# Patient Record
Sex: FEMALE | Race: BLACK OR AFRICAN AMERICAN | Employment: OTHER | URBAN - METROPOLITAN AREA
[De-identification: names, ages, dates, MRNs, and addresses within clinical notes are randomized per-mention and may not be internally consistent; named-entity substitution may affect disease eponyms.]

---

## 2023-01-01 ENCOUNTER — HOSPITAL ENCOUNTER (EMERGENCY)
Age: 78
End: 2023-06-29
Attending: EMERGENCY MEDICINE
Payer: MEDICARE

## 2023-01-01 DIAGNOSIS — I46.9 CARDIOPULMONARY ARREST (HCC): Primary | ICD-10-CM

## 2023-01-01 LAB
GLUCOSE BLD-MCNC: 345 MG/DL (ref 70–99)
PERFORMED ON: ABNORMAL

## 2023-01-01 PROCEDURE — 36415 COLL VENOUS BLD VENIPUNCTURE: CPT

## 2023-01-01 PROCEDURE — 83880 ASSAY OF NATRIURETIC PEPTIDE: CPT

## 2023-01-01 PROCEDURE — 31500 INSERT EMERGENCY AIRWAY: CPT

## 2023-01-01 PROCEDURE — 85025 COMPLETE CBC W/AUTO DIFF WBC: CPT

## 2023-01-01 PROCEDURE — 85610 PROTHROMBIN TIME: CPT

## 2023-01-01 PROCEDURE — 80053 COMPREHEN METABOLIC PANEL: CPT

## 2023-01-01 PROCEDURE — 84484 ASSAY OF TROPONIN QUANT: CPT

## 2023-01-01 PROCEDURE — 92950 HEART/LUNG RESUSCITATION CPR: CPT

## 2023-01-01 PROCEDURE — 99284 EMERGENCY DEPT VISIT MOD MDM: CPT

## 2023-04-05 ENCOUNTER — APPOINTMENT (OUTPATIENT)
Dept: CT IMAGING | Age: 78
End: 2023-04-05
Payer: MEDICARE

## 2023-04-05 ENCOUNTER — APPOINTMENT (OUTPATIENT)
Dept: GENERAL RADIOLOGY | Age: 78
End: 2023-04-05
Payer: MEDICARE

## 2023-04-05 ENCOUNTER — HOSPITAL ENCOUNTER (INPATIENT)
Age: 78
LOS: 2 days | Discharge: HOME | End: 2023-04-08
Attending: EMERGENCY MEDICINE | Admitting: STUDENT IN AN ORGANIZED HEALTH CARE EDUCATION/TRAINING PROGRAM
Payer: MEDICARE

## 2023-04-05 DIAGNOSIS — S00.11XA TRAUMATIC HEMATOMA OF RIGHT EYEBROW, INITIAL ENCOUNTER: ICD-10-CM

## 2023-04-05 DIAGNOSIS — R79.89 ELEVATED LACTIC ACID LEVEL: ICD-10-CM

## 2023-04-05 DIAGNOSIS — R74.8 ELEVATED CK: Primary | ICD-10-CM

## 2023-04-05 DIAGNOSIS — A41.9 SEPTICEMIA (HCC): ICD-10-CM

## 2023-04-05 DIAGNOSIS — D32.0 MENINGIOMA, CEREBRAL (HCC): ICD-10-CM

## 2023-04-05 DIAGNOSIS — R29.6 FREQUENT FALLS: ICD-10-CM

## 2023-04-05 LAB
ALBUMIN SERPL-MCNC: 4.1 G/DL (ref 3.4–5)
ALBUMIN/GLOB SERPL: 1.2 {RATIO} (ref 1.1–2.2)
ALP SERPL-CCNC: 129 U/L (ref 40–129)
ALT SERPL-CCNC: 13 U/L (ref 10–40)
ANION GAP SERPL CALCULATED.3IONS-SCNC: 15 MMOL/L (ref 3–16)
AST SERPL-CCNC: 24 U/L (ref 15–37)
BILIRUB SERPL-MCNC: 0.9 MG/DL (ref 0–1)
BUN SERPL-MCNC: 19 MG/DL (ref 7–20)
CALCIUM SERPL-MCNC: 9.5 MG/DL (ref 8.3–10.6)
CHLORIDE SERPL-SCNC: 96 MMOL/L (ref 99–110)
CK SERPL-CCNC: 689 U/L (ref 26–192)
CO2 SERPL-SCNC: 21 MMOL/L (ref 21–32)
CREAT SERPL-MCNC: 0.9 MG/DL (ref 0.6–1.2)
DEPRECATED RDW RBC AUTO: 20.5 % (ref 12.4–15.4)
GFR SERPLBLD CREATININE-BSD FMLA CKD-EPI: >60 ML/MIN/{1.73_M2}
GLUCOSE SERPL-MCNC: 426 MG/DL (ref 70–99)
HCT VFR BLD AUTO: 44.3 % (ref 36–48)
HGB BLD-MCNC: 14.4 G/DL (ref 12–16)
LACTATE BLDV-SCNC: 2.6 MMOL/L (ref 0.4–1.9)
MCH RBC QN AUTO: 22.8 PG (ref 26–34)
MCHC RBC AUTO-ENTMCNC: 32.4 G/DL (ref 31–36)
MCV RBC AUTO: 70.4 FL (ref 80–100)
PLATELET # BLD AUTO: 197 K/UL (ref 135–450)
PMV BLD AUTO: 8.6 FL (ref 5–10.5)
POTASSIUM SERPL-SCNC: 3.8 MMOL/L (ref 3.5–5.1)
PROT SERPL-MCNC: 7.4 G/DL (ref 6.4–8.2)
RBC # BLD AUTO: 6.29 M/UL (ref 4–5.2)
SODIUM SERPL-SCNC: 132 MMOL/L (ref 136–145)
TROPONIN T SERPL-MCNC: <0.01 NG/ML
WBC # BLD AUTO: 11.4 K/UL (ref 4–11)

## 2023-04-05 PROCEDURE — 85379 FIBRIN DEGRADATION QUANT: CPT

## 2023-04-05 PROCEDURE — 80053 COMPREHEN METABOLIC PANEL: CPT

## 2023-04-05 PROCEDURE — 6360000004 HC RX CONTRAST MEDICATION: Performed by: EMERGENCY MEDICINE

## 2023-04-05 PROCEDURE — 70460 CT HEAD/BRAIN W/DYE: CPT

## 2023-04-05 PROCEDURE — 93005 ELECTROCARDIOGRAM TRACING: CPT | Performed by: EMERGENCY MEDICINE

## 2023-04-05 PROCEDURE — 83605 ASSAY OF LACTIC ACID: CPT

## 2023-04-05 PROCEDURE — 87040 BLOOD CULTURE FOR BACTERIA: CPT

## 2023-04-05 PROCEDURE — 85027 COMPLETE CBC AUTOMATED: CPT

## 2023-04-05 PROCEDURE — 84484 ASSAY OF TROPONIN QUANT: CPT

## 2023-04-05 PROCEDURE — 81001 URINALYSIS AUTO W/SCOPE: CPT

## 2023-04-05 PROCEDURE — 87150 DNA/RNA AMPLIFIED PROBE: CPT

## 2023-04-05 PROCEDURE — 2580000003 HC RX 258: Performed by: EMERGENCY MEDICINE

## 2023-04-05 PROCEDURE — 84443 ASSAY THYROID STIM HORMONE: CPT

## 2023-04-05 PROCEDURE — 70450 CT HEAD/BRAIN W/O DYE: CPT

## 2023-04-05 PROCEDURE — 71046 X-RAY EXAM CHEST 2 VIEWS: CPT

## 2023-04-05 PROCEDURE — 82550 ASSAY OF CK (CPK): CPT

## 2023-04-05 RX ORDER — 0.9 % SODIUM CHLORIDE 0.9 %
500 INTRAVENOUS SOLUTION INTRAVENOUS ONCE
Status: COMPLETED | OUTPATIENT
Start: 2023-04-06 | End: 2023-04-06

## 2023-04-05 RX ORDER — 0.9 % SODIUM CHLORIDE 0.9 %
1000 INTRAVENOUS SOLUTION INTRAVENOUS ONCE
Status: DISCONTINUED | OUTPATIENT
Start: 2023-04-06 | End: 2023-04-05

## 2023-04-05 RX ORDER — 0.9 % SODIUM CHLORIDE 0.9 %
1000 INTRAVENOUS SOLUTION INTRAVENOUS ONCE
Status: COMPLETED | OUTPATIENT
Start: 2023-04-05 | End: 2023-04-05

## 2023-04-05 RX ADMIN — IOMEPROL INJECTION 75 ML: 714 INJECTION, SOLUTION INTRAVASCULAR at 23:26

## 2023-04-05 RX ADMIN — SODIUM CHLORIDE 1000 ML: 9 INJECTION, SOLUTION INTRAVENOUS at 21:39

## 2023-04-05 ASSESSMENT — ENCOUNTER SYMPTOMS
DIARRHEA: 0
CHEST TIGHTNESS: 0
NAUSEA: 0
ABDOMINAL PAIN: 0
CONSTIPATION: 0
SHORTNESS OF BREATH: 0
VOMITING: 0
SORE THROAT: 0

## 2023-04-05 ASSESSMENT — PAIN - FUNCTIONAL ASSESSMENT: PAIN_FUNCTIONAL_ASSESSMENT: 0-10

## 2023-04-05 ASSESSMENT — PAIN SCALES - GENERAL: PAINLEVEL_OUTOF10: 0

## 2023-04-05 ASSESSMENT — LIFESTYLE VARIABLES: HOW OFTEN DO YOU HAVE A DRINK CONTAINING ALCOHOL: NEVER

## 2023-04-06 ENCOUNTER — APPOINTMENT (OUTPATIENT)
Dept: MRI IMAGING | Age: 78
End: 2023-04-06
Payer: MEDICARE

## 2023-04-06 ENCOUNTER — APPOINTMENT (OUTPATIENT)
Dept: CT IMAGING | Age: 78
End: 2023-04-06
Payer: MEDICARE

## 2023-04-06 PROBLEM — R00.0 TACHYCARDIA: Status: ACTIVE | Noted: 2023-04-06

## 2023-04-06 PROBLEM — D72.829 LEUKOCYTOSIS: Status: ACTIVE | Noted: 2023-01-01

## 2023-04-06 PROBLEM — E11.9 DM2 (DIABETES MELLITUS, TYPE 2) (HCC): Status: ACTIVE | Noted: 2023-01-01

## 2023-04-06 PROBLEM — R53.1 GENERALIZED WEAKNESS: Status: ACTIVE | Noted: 2023-01-01

## 2023-04-06 LAB
BACTERIA URNS QL MICRO: ABNORMAL /HPF
BILIRUB UR QL STRIP.AUTO: NEGATIVE
CLARITY UR: CLEAR
COLOR UR: YELLOW
D DIMER: 1.07 UG/ML FEU (ref 0–0.6)
EKG ATRIAL RATE: 117 BPM
EKG DIAGNOSIS: NORMAL
EKG P AXIS: -19 DEGREES
EKG P-R INTERVAL: 152 MS
EKG Q-T INTERVAL: 340 MS
EKG QRS DURATION: 74 MS
EKG QTC CALCULATION (BAZETT): 474 MS
EKG R AXIS: -17 DEGREES
EKG T AXIS: 24 DEGREES
EKG VENTRICULAR RATE: 117 BPM
EPI CELLS #/AREA URNS AUTO: 6 /HPF (ref 0–5)
EPITHELIALS (RENAL), 013149: PRESENT
FLUAV RNA UPPER RESP QL NAA+PROBE: NEGATIVE
FLUBV AG NPH QL: NEGATIVE
GLUCOSE BLD-MCNC: 171 MG/DL (ref 70–99)
GLUCOSE BLD-MCNC: 182 MG/DL (ref 70–99)
GLUCOSE BLD-MCNC: 238 MG/DL (ref 70–99)
GLUCOSE BLD-MCNC: 264 MG/DL (ref 70–99)
GLUCOSE BLD-MCNC: 306 MG/DL (ref 70–99)
GLUCOSE BLD-MCNC: 313 MG/DL (ref 70–99)
GLUCOSE UR STRIP.AUTO-MCNC: >=1000 MG/DL
HGB UR QL STRIP.AUTO: ABNORMAL
HYALINE CASTS #/AREA URNS AUTO: 2 /LPF (ref 0–8)
KETONES UR STRIP.AUTO-MCNC: 15 MG/DL
LACTATE BLDV-SCNC: 1.7 MMOL/L (ref 0.4–1.9)
LEUKOCYTE ESTERASE UR QL STRIP.AUTO: NEGATIVE
NITRITE UR QL STRIP.AUTO: NEGATIVE
PERFORMED ON: ABNORMAL
PH UR STRIP.AUTO: 6 [PH] (ref 5–8)
PROT UR STRIP.AUTO-MCNC: 30 MG/DL
RBC CLUMPS #/AREA URNS AUTO: 1 /HPF (ref 0–4)
SARS-COV-2 RDRP RESP QL NAA+PROBE: NOT DETECTED
SP GR UR STRIP.AUTO: 1.02 (ref 1–1.03)
TSH SERPL DL<=0.005 MIU/L-ACNC: 3.52 UIU/ML (ref 0.27–4.2)
UA COMPLETE W REFLEX CULTURE PNL UR: ABNORMAL
UA DIPSTICK W REFLEX MICRO PNL UR: YES
URN SPEC COLLECT METH UR: ABNORMAL
UROBILINOGEN UR STRIP-ACNC: 0.2 E.U./DL
WBC #/AREA URNS AUTO: 2 /HPF (ref 0–5)

## 2023-04-06 PROCEDURE — 6370000000 HC RX 637 (ALT 250 FOR IP): Performed by: STUDENT IN AN ORGANIZED HEALTH CARE EDUCATION/TRAINING PROGRAM

## 2023-04-06 PROCEDURE — 2580000003 HC RX 258: Performed by: STUDENT IN AN ORGANIZED HEALTH CARE EDUCATION/TRAINING PROGRAM

## 2023-04-06 PROCEDURE — 97535 SELF CARE MNGMENT TRAINING: CPT

## 2023-04-06 PROCEDURE — 6360000002 HC RX W HCPCS: Performed by: STUDENT IN AN ORGANIZED HEALTH CARE EDUCATION/TRAINING PROGRAM

## 2023-04-06 PROCEDURE — 6360000002 HC RX W HCPCS: Performed by: INTERNAL MEDICINE

## 2023-04-06 PROCEDURE — 6370000000 HC RX 637 (ALT 250 FOR IP): Performed by: INTERNAL MEDICINE

## 2023-04-06 PROCEDURE — 97530 THERAPEUTIC ACTIVITIES: CPT

## 2023-04-06 PROCEDURE — 94760 N-INVAS EAR/PLS OXIMETRY 1: CPT

## 2023-04-06 PROCEDURE — 87635 SARS-COV-2 COVID-19 AMP PRB: CPT

## 2023-04-06 PROCEDURE — 2500000003 HC RX 250 WO HCPCS: Performed by: STUDENT IN AN ORGANIZED HEALTH CARE EDUCATION/TRAINING PROGRAM

## 2023-04-06 PROCEDURE — 97116 GAIT TRAINING THERAPY: CPT

## 2023-04-06 PROCEDURE — A9577 INJ MULTIHANCE: HCPCS | Performed by: FAMILY MEDICINE

## 2023-04-06 PROCEDURE — 70553 MRI BRAIN STEM W/O & W/DYE: CPT

## 2023-04-06 PROCEDURE — 1200000000 HC SEMI PRIVATE

## 2023-04-06 PROCEDURE — 2580000003 HC RX 258: Performed by: EMERGENCY MEDICINE

## 2023-04-06 PROCEDURE — 6360000004 HC RX CONTRAST MEDICATION: Performed by: STUDENT IN AN ORGANIZED HEALTH CARE EDUCATION/TRAINING PROGRAM

## 2023-04-06 PROCEDURE — 2500000003 HC RX 250 WO HCPCS: Performed by: INTERNAL MEDICINE

## 2023-04-06 PROCEDURE — 6360000004 HC RX CONTRAST MEDICATION: Performed by: FAMILY MEDICINE

## 2023-04-06 PROCEDURE — 87040 BLOOD CULTURE FOR BACTERIA: CPT

## 2023-04-06 PROCEDURE — 87804 INFLUENZA ASSAY W/OPTIC: CPT

## 2023-04-06 PROCEDURE — 83605 ASSAY OF LACTIC ACID: CPT

## 2023-04-06 PROCEDURE — 97166 OT EVAL MOD COMPLEX 45 MIN: CPT

## 2023-04-06 PROCEDURE — 36415 COLL VENOUS BLD VENIPUNCTURE: CPT

## 2023-04-06 PROCEDURE — 97162 PT EVAL MOD COMPLEX 30 MIN: CPT

## 2023-04-06 PROCEDURE — 93010 ELECTROCARDIOGRAM REPORT: CPT | Performed by: INTERNAL MEDICINE

## 2023-04-06 PROCEDURE — 6360000002 HC RX W HCPCS: Performed by: EMERGENCY MEDICINE

## 2023-04-06 PROCEDURE — 71260 CT THORAX DX C+: CPT | Performed by: STUDENT IN AN ORGANIZED HEALTH CARE EDUCATION/TRAINING PROGRAM

## 2023-04-06 PROCEDURE — 93005 ELECTROCARDIOGRAM TRACING: CPT | Performed by: INTERNAL MEDICINE

## 2023-04-06 RX ORDER — INSULIN LISPRO 100 [IU]/ML
4 INJECTION, SOLUTION INTRAVENOUS; SUBCUTANEOUS ONCE
Status: COMPLETED | OUTPATIENT
Start: 2023-04-06 | End: 2023-04-06

## 2023-04-06 RX ORDER — SODIUM CHLORIDE 0.9 % (FLUSH) 0.9 %
5-40 SYRINGE (ML) INJECTION PRN
Status: DISCONTINUED | OUTPATIENT
Start: 2023-04-06 | End: 2023-04-08 | Stop reason: HOSPADM

## 2023-04-06 RX ORDER — DEXTROSE MONOHYDRATE 100 MG/ML
INJECTION, SOLUTION INTRAVENOUS CONTINUOUS PRN
Status: DISCONTINUED | OUTPATIENT
Start: 2023-04-06 | End: 2023-04-08 | Stop reason: HOSPADM

## 2023-04-06 RX ORDER — HYDRALAZINE HYDROCHLORIDE 20 MG/ML
5 INJECTION INTRAMUSCULAR; INTRAVENOUS ONCE
Status: COMPLETED | OUTPATIENT
Start: 2023-04-06 | End: 2023-04-06

## 2023-04-06 RX ORDER — INSULIN GLARGINE 100 [IU]/ML
10 INJECTION, SOLUTION SUBCUTANEOUS NIGHTLY
Status: DISCONTINUED | OUTPATIENT
Start: 2023-04-06 | End: 2023-04-08 | Stop reason: HOSPADM

## 2023-04-06 RX ORDER — ATENOLOL 25 MG/1
25 TABLET ORAL DAILY
COMMUNITY

## 2023-04-06 RX ORDER — ACETAMINOPHEN 650 MG/1
650 SUPPOSITORY RECTAL EVERY 6 HOURS PRN
Status: DISCONTINUED | OUTPATIENT
Start: 2023-04-06 | End: 2023-04-08 | Stop reason: HOSPADM

## 2023-04-06 RX ORDER — ASPIRIN 81 MG/1
81 TABLET, CHEWABLE ORAL DAILY
COMMUNITY

## 2023-04-06 RX ORDER — INSULIN LISPRO 100 [IU]/ML
0-16 INJECTION, SOLUTION INTRAVENOUS; SUBCUTANEOUS
Status: DISCONTINUED | OUTPATIENT
Start: 2023-04-06 | End: 2023-04-08 | Stop reason: HOSPADM

## 2023-04-06 RX ORDER — LABETALOL HYDROCHLORIDE 5 MG/ML
10 INJECTION, SOLUTION INTRAVENOUS ONCE
Status: COMPLETED | OUTPATIENT
Start: 2023-04-06 | End: 2023-04-06

## 2023-04-06 RX ORDER — INSULIN LISPRO 100 [IU]/ML
0-8 INJECTION, SOLUTION INTRAVENOUS; SUBCUTANEOUS
Status: DISCONTINUED | OUTPATIENT
Start: 2023-04-06 | End: 2023-04-06

## 2023-04-06 RX ORDER — HYDRALAZINE HYDROCHLORIDE 20 MG/ML
10 INJECTION INTRAMUSCULAR; INTRAVENOUS EVERY 6 HOURS PRN
Status: DISCONTINUED | OUTPATIENT
Start: 2023-04-06 | End: 2023-04-07

## 2023-04-06 RX ORDER — SODIUM CHLORIDE 9 MG/ML
INJECTION, SOLUTION INTRAVENOUS PRN
Status: DISCONTINUED | OUTPATIENT
Start: 2023-04-06 | End: 2023-04-08 | Stop reason: HOSPADM

## 2023-04-06 RX ORDER — BUTALBITAL, ACETAMINOPHEN AND CAFFEINE 50; 325; 40 MG/1; MG/1; MG/1
1 TABLET ORAL ONCE
Status: COMPLETED | OUTPATIENT
Start: 2023-04-06 | End: 2023-04-06

## 2023-04-06 RX ORDER — ACETAMINOPHEN 325 MG/1
650 TABLET ORAL EVERY 6 HOURS PRN
Status: DISCONTINUED | OUTPATIENT
Start: 2023-04-06 | End: 2023-04-08 | Stop reason: HOSPADM

## 2023-04-06 RX ORDER — 0.9 % SODIUM CHLORIDE 0.9 %
2000 INTRAVENOUS SOLUTION INTRAVENOUS ONCE
Status: COMPLETED | OUTPATIENT
Start: 2023-04-06 | End: 2023-04-06

## 2023-04-06 RX ORDER — GLIMEPIRIDE 4 MG/1
4 TABLET ORAL
COMMUNITY

## 2023-04-06 RX ORDER — POLYETHYLENE GLYCOL 3350 17 G/17G
17 POWDER, FOR SOLUTION ORAL DAILY PRN
Status: DISCONTINUED | OUTPATIENT
Start: 2023-04-06 | End: 2023-04-08 | Stop reason: HOSPADM

## 2023-04-06 RX ORDER — ONDANSETRON 2 MG/ML
4 INJECTION INTRAMUSCULAR; INTRAVENOUS EVERY 6 HOURS PRN
Status: DISCONTINUED | OUTPATIENT
Start: 2023-04-06 | End: 2023-04-08 | Stop reason: HOSPADM

## 2023-04-06 RX ORDER — INSULIN LISPRO 100 [IU]/ML
0-4 INJECTION, SOLUTION INTRAVENOUS; SUBCUTANEOUS NIGHTLY
Status: DISCONTINUED | OUTPATIENT
Start: 2023-04-06 | End: 2023-04-08 | Stop reason: HOSPADM

## 2023-04-06 RX ORDER — HYDRALAZINE HYDROCHLORIDE 20 MG/ML
5 INJECTION INTRAMUSCULAR; INTRAVENOUS EVERY 6 HOURS PRN
Status: DISCONTINUED | OUTPATIENT
Start: 2023-04-06 | End: 2023-04-06

## 2023-04-06 RX ORDER — INSULIN LISPRO 100 [IU]/ML
0-4 INJECTION, SOLUTION INTRAVENOUS; SUBCUTANEOUS NIGHTLY
Status: DISCONTINUED | OUTPATIENT
Start: 2023-04-06 | End: 2023-04-06

## 2023-04-06 RX ORDER — ENOXAPARIN SODIUM 100 MG/ML
40 INJECTION SUBCUTANEOUS DAILY
Status: DISCONTINUED | OUTPATIENT
Start: 2023-04-06 | End: 2023-04-08 | Stop reason: HOSPADM

## 2023-04-06 RX ORDER — CARVEDILOL 3.12 MG/1
3.12 TABLET ORAL 2 TIMES DAILY WITH MEALS
Status: DISCONTINUED | OUTPATIENT
Start: 2023-04-06 | End: 2023-04-08 | Stop reason: HOSPADM

## 2023-04-06 RX ORDER — METFORMIN HYDROCHLORIDE 500 MG/1
1500 TABLET, EXTENDED RELEASE ORAL
COMMUNITY

## 2023-04-06 RX ORDER — SODIUM CHLORIDE 0.9 % (FLUSH) 0.9 %
5-40 SYRINGE (ML) INJECTION EVERY 12 HOURS SCHEDULED
Status: DISCONTINUED | OUTPATIENT
Start: 2023-04-06 | End: 2023-04-08 | Stop reason: HOSPADM

## 2023-04-06 RX ORDER — BUDESONIDE AND FORMOTEROL FUMARATE DIHYDRATE 160; 4.5 UG/1; UG/1
2 AEROSOL RESPIRATORY (INHALATION) DAILY
COMMUNITY

## 2023-04-06 RX ADMIN — Medication 10 ML: at 13:36

## 2023-04-06 RX ADMIN — INSULIN LISPRO 2 UNITS: 100 INJECTION, SOLUTION INTRAVENOUS; SUBCUTANEOUS at 09:34

## 2023-04-06 RX ADMIN — SODIUM CHLORIDE 2000 ML: 9 INJECTION, SOLUTION INTRAVENOUS at 01:15

## 2023-04-06 RX ADMIN — INSULIN LISPRO 4 UNITS: 100 INJECTION, SOLUTION INTRAVENOUS; SUBCUTANEOUS at 13:28

## 2023-04-06 RX ADMIN — LABETALOL HYDROCHLORIDE 10 MG: 5 INJECTION, SOLUTION INTRAVENOUS at 03:45

## 2023-04-06 RX ADMIN — INSULIN LISPRO 4 UNITS: 100 INJECTION, SOLUTION INTRAVENOUS; SUBCUTANEOUS at 22:00

## 2023-04-06 RX ADMIN — Medication 10 ML: at 20:22

## 2023-04-06 RX ADMIN — CEFEPIME 2000 MG: 2 INJECTION, POWDER, FOR SOLUTION INTRAVENOUS at 00:08

## 2023-04-06 RX ADMIN — ENOXAPARIN SODIUM 40 MG: 100 INJECTION SUBCUTANEOUS at 13:29

## 2023-04-06 RX ADMIN — INSULIN HUMAN 10 UNITS: 100 INJECTION, SOLUTION PARENTERAL at 02:23

## 2023-04-06 RX ADMIN — LABETALOL HYDROCHLORIDE 10 MG: 5 INJECTION INTRAVENOUS at 19:21

## 2023-04-06 RX ADMIN — HYDRALAZINE HYDROCHLORIDE 5 MG: 20 INJECTION INTRAMUSCULAR; INTRAVENOUS at 15:52

## 2023-04-06 RX ADMIN — VANCOMYCIN HYDROCHLORIDE 1500 MG: 1.5 INJECTION, POWDER, LYOPHILIZED, FOR SOLUTION INTRAVENOUS at 00:56

## 2023-04-06 RX ADMIN — ACETAMINOPHEN 650 MG: 325 TABLET ORAL at 08:57

## 2023-04-06 RX ADMIN — IOMEPROL INJECTION 75 ML: 714 INJECTION, SOLUTION INTRAVASCULAR at 02:39

## 2023-04-06 RX ADMIN — GADOBENATE DIMEGLUMINE 19 ML: 529 INJECTION, SOLUTION INTRAVENOUS at 10:36

## 2023-04-06 RX ADMIN — HYDRALAZINE HYDROCHLORIDE 10 MG: 20 INJECTION INTRAMUSCULAR; INTRAVENOUS at 21:59

## 2023-04-06 RX ADMIN — INSULIN GLARGINE 10 UNITS: 100 INJECTION, SOLUTION SUBCUTANEOUS at 03:53

## 2023-04-06 RX ADMIN — INSULIN GLARGINE 10 UNITS: 100 INJECTION, SOLUTION SUBCUTANEOUS at 22:00

## 2023-04-06 RX ADMIN — HYDRALAZINE HYDROCHLORIDE 5 MG: 20 INJECTION INTRAMUSCULAR; INTRAVENOUS at 17:30

## 2023-04-06 RX ADMIN — SODIUM CHLORIDE 500 ML: 9 INJECTION, SOLUTION INTRAVENOUS at 00:08

## 2023-04-06 RX ADMIN — CARVEDILOL 3.12 MG: 3.12 TABLET, FILM COATED ORAL at 19:21

## 2023-04-06 RX ADMIN — INSULIN LISPRO 4 UNITS: 100 INJECTION, SOLUTION INTRAVENOUS; SUBCUTANEOUS at 13:23

## 2023-04-06 RX ADMIN — BUTALBITAL, ACETAMINOPHEN, AND CAFFEINE 1 TABLET: 50; 325; 40 TABLET ORAL at 13:29

## 2023-04-06 ASSESSMENT — PAIN DESCRIPTION - DESCRIPTORS
DESCRIPTORS: ACHING
DESCRIPTORS: ACHING

## 2023-04-06 ASSESSMENT — PAIN DESCRIPTION - ORIENTATION
ORIENTATION: MID
ORIENTATION: MID

## 2023-04-06 ASSESSMENT — PAIN DESCRIPTION - LOCATION
LOCATION: HEAD
LOCATION: HEAD

## 2023-04-06 ASSESSMENT — PAIN SCALES - GENERAL
PAINLEVEL_OUTOF10: 3
PAINLEVEL_OUTOF10: 3

## 2023-04-06 NOTE — ED NOTES
Pt given turkey sandwich, jello, and diet pepsi at this time per Dr. Jace Garcia MD verbal order. Pt tolerating well. Will continue to monitor.       Milly Goodman RN  04/05/23 4839

## 2023-04-06 NOTE — ACP (ADVANCE CARE PLANNING)
patient/agent/surrogate to review completed ACP document and update if needed with changes in condition, patient preferences or care setting    [] This note routed to one or more involved healthcare providers

## 2023-04-06 NOTE — ED NOTES
Pt placed on purewick at this time. Pt verbalized understanding that she needs to provide a urine sample. Pt tolerated well.       Karlo Bennett RN  04/05/23 0213

## 2023-04-06 NOTE — ED NOTES
Pt gave urine sample via NXT-ID at this time. Dr. Omid Huerta MD notified. No need to straight cath pt at this time.       Sandra Araujo RN  04/05/23 9593

## 2023-04-06 NOTE — ED PROVIDER NOTES
Social History, vitals, and allergies; agree with all. REVIEW OF SYSTEMS       Review of Systems   Constitutional:  Positive for fatigue. Negative for chills, diaphoresis and fever. HENT:  Negative for congestion and sore throat. Respiratory:  Negative for chest tightness and shortness of breath. Cardiovascular:  Negative for chest pain and leg swelling. Gastrointestinal:  Negative for abdominal pain, constipation, diarrhea, nausea and vomiting. Genitourinary:  Positive for frequency. Negative for dysuria and urgency. Musculoskeletal:  Negative for arthralgias and neck pain. Skin:  Positive for wound. Negative for rash. Neurological:  Negative for dizziness, syncope, weakness, light-headedness, numbness and headaches. Psychiatric/Behavioral:  Negative for agitation, behavioral problems and confusion. Except as noted above the remainder of the review of systems was reviewed and negative. PAST MEDICAL HISTORY     Past Medical History:   Diagnosis Date    Acid reflux     Arthritis     Asthma     Diabetes in pregnancy     High blood pressure        SURGICAL HISTORY       Past Surgical History:   Procedure Laterality Date    CHOLECYSTECTOMY      HYSTERECTOMY (CERVIX STATUS UNKNOWN)      TOTAL KNEE ARTHROPLASTY Right 8/14/12    TOTAL KNEE ARTHROPLASTY Left 9/6/13       CURRENT MEDICATIONS       Previous Medications    ASPIRIN 325 MG EC TABLET    Take 325 mg by mouth daily. DIAZEPAM (VALIUM) 10 MG TABLET    Take 1 tablet by mouth every 8 hours as needed for Anxiety. DOCUSATE SODIUM (COLACE) 100 MG CAPSULE    Take 1 capsule by mouth 2 times daily. HYDROCODONE-ACETAMINOPHEN (VICODIN) 5-500 MG PER TABLET    Take 1 tablet by mouth every 6 hours as needed for Pain. IBUPROFEN (IBU) 800 MG TABLET    Take 1 tablet by mouth every 8 hours as needed for Pain. NAPROXEN (NAPROSYN PO)    Take  by mouth.       OXYCODONE-ACETAMINOPHEN (PERCOCET) 5-325 MG PER TABLET    Take 1-2 tablets by

## 2023-04-06 NOTE — ED NOTES
Report called to RAMIRO Daniel. All questions answered at this time.       Alexander Adams RN  04/06/23 6218

## 2023-04-06 NOTE — H&P
detected  Abdomen: Soft, nontender, nondistended, active bowel sounds  Extremities: No edema  Skin: No rashes  Neurologic: Grossly intact neurologically, strength is 5 of 5 to both upper and lower extremities symmetrically, no sensory deficits, no nystagmus  Mental status: Alert, oriented, thought content appropriate. Capillary Refill: Acceptable  < 3 seconds  Peripheral Pulses: +3 Easily felt, not easily obliterated with pressure    CT PE study chest:  No evidence of pulmonary embolism. No evidence of thoracic aortic aneurysm or dissection. No acute process in the mediastinum. No evidence of pericardial effusion. No definable focal abnormality or acute process in the lungs or in the   pleural spaces. On the subphrenic images, there is no definable acute process. At the upper   lateral aspect of right kidney there is an exophytic calcified small mass,   likely to be calcified or calcifying small exophytic cyst measuring 7-8 mm. CT head with IV contrast:  1. Densely enhancing mass in the frontal area which is extra-axial likely   arising from the sphenoid ridge representing a large meningioma which   measures 4.2 x 4.5 cm. This is an extra-axial mass. 2. No other significant abnormality. 04/05/23 2253  CT HEAD WO CONTRAST   Performed: 04/05/23 2223  Final        Impression: Large mass in the frontal region which is most likely a meningioma. However, intravenous contrast (not a CTA ) is recommended. 04/05/23 2055  XR CHEST (2 VW)   Performed: 04/05/23 2045  Final  Specimen: Chest        Impression: No acute process. CBC   Recent Labs     04/05/23 2136   WBC 11.4*   HGB 14.4   HCT 44.3         RENAL  Recent Labs     04/05/23 2136   *   K 3.8   CL 96*   CO2 21   BUN 19   CREATININE 0.9     LFT'S  Recent Labs     04/05/23 2136   AST 24   ALT 13   BILITOT 0.9   ALKPHOS 129     COAG  No results for input(s): INR in the last 72 hours.   CARDIAC

## 2023-04-06 NOTE — ED TRIAGE NOTES
Pt arrived to dept via EMS. Pt states that she has had 2 falls in the last 2 days. Pt states that she fell on 4/4 at her home and was on the floor for 20 hrs. Pt states that she fell while sitting down on her bed. Pt denies hitting her head on first fall. Pt then went to [facility] and slipped while getting into shower. Pt states that she hit her head and has a contusion on right eye. Pt denies LOC from either fall. Pt daughter states that pt has neuropathy in her left foot, which is the foot that she slipped on. Pt has hx of diabetes. Pt BS via EMS was 456. Pt denies taking blood thinners. Pt awake, alert and oriented x 3. Skin warm and dry/normal color for ethnicity. Pt has a contusion on the right side of her chest. Resp easy and unlabored. Pt placed in gown and on cardiac monitor. Call light in reach. Will continue to monitor.

## 2023-04-07 LAB
ALBUMIN SERPL-MCNC: 3.4 G/DL (ref 3.4–5)
ANION GAP SERPL CALCULATED.3IONS-SCNC: 12 MMOL/L (ref 3–16)
ANISOCYTOSIS BLD QL SMEAR: ABNORMAL
BACTERIA BLD CULT ORG #2: NORMAL
BASOPHILS # BLD: 0 K/UL (ref 0–0.2)
BASOPHILS NFR BLD: 0.4 %
BUN SERPL-MCNC: 13 MG/DL (ref 7–20)
CALCIUM SERPL-MCNC: 8.7 MG/DL (ref 8.3–10.6)
CHLORIDE SERPL-SCNC: 103 MMOL/L (ref 99–110)
CK SERPL-CCNC: 365 U/L (ref 26–192)
CO2 SERPL-SCNC: 23 MMOL/L (ref 21–32)
CREAT SERPL-MCNC: 0.8 MG/DL (ref 0.6–1.2)
DEPRECATED RDW RBC AUTO: 20.3 % (ref 12.4–15.4)
EKG ATRIAL RATE: 119 BPM
EKG DIAGNOSIS: NORMAL
EKG P AXIS: 4 DEGREES
EKG P-R INTERVAL: 156 MS
EKG Q-T INTERVAL: 334 MS
EKG QRS DURATION: 76 MS
EKG QTC CALCULATION (BAZETT): 469 MS
EKG R AXIS: 1 DEGREES
EKG T AXIS: 46 DEGREES
EKG VENTRICULAR RATE: 119 BPM
EOSINOPHIL # BLD: 0.1 K/UL (ref 0–0.6)
EOSINOPHIL NFR BLD: 2.1 %
GFR SERPLBLD CREATININE-BSD FMLA CKD-EPI: >60 ML/MIN/{1.73_M2}
GLUCOSE BLD-MCNC: 248 MG/DL (ref 70–99)
GLUCOSE BLD-MCNC: 254 MG/DL (ref 70–99)
GLUCOSE BLD-MCNC: 264 MG/DL (ref 70–99)
GLUCOSE BLD-MCNC: 289 MG/DL (ref 70–99)
GLUCOSE SERPL-MCNC: 263 MG/DL (ref 70–99)
HCT VFR BLD AUTO: 38.9 % (ref 36–48)
HGB BLD-MCNC: 12.7 G/DL (ref 12–16)
LV EF: 70 %
LVEF MODALITY: NORMAL
LYMPHOCYTES # BLD: 2.2 K/UL (ref 1–5.1)
LYMPHOCYTES NFR BLD: 34.8 %
MAGNESIUM SERPL-MCNC: 1.8 MG/DL (ref 1.8–2.4)
MCH RBC QN AUTO: 22.8 PG (ref 26–34)
MCHC RBC AUTO-ENTMCNC: 32.7 G/DL (ref 31–36)
MCV RBC AUTO: 69.8 FL (ref 80–100)
MICROCYTES BLD QL SMEAR: ABNORMAL
MONOCYTES # BLD: 0.4 K/UL (ref 0–1.3)
MONOCYTES NFR BLD: 7.1 %
NEUTROPHILS # BLD: 3.5 K/UL (ref 1.7–7.7)
NEUTROPHILS NFR BLD: 55.6 %
PATH INTERP BLD-IMP: NORMAL
PATH INTERP BLD-IMP: YES
PERFORMED ON: ABNORMAL
PHOSPHATE SERPL-MCNC: 3.4 MG/DL (ref 2.5–4.9)
PLATELET # BLD AUTO: 176 K/UL (ref 135–450)
PLATELET BLD QL SMEAR: ADEQUATE
PMV BLD AUTO: 8.6 FL (ref 5–10.5)
POTASSIUM SERPL-SCNC: 3.2 MMOL/L (ref 3.5–5.1)
RBC # BLD AUTO: 5.57 M/UL (ref 4–5.2)
REPORT: NORMAL
SLIDE REVIEW: ABNORMAL
SODIUM SERPL-SCNC: 138 MMOL/L (ref 136–145)
WBC # BLD AUTO: 6.3 K/UL (ref 4–11)

## 2023-04-07 PROCEDURE — 97116 GAIT TRAINING THERAPY: CPT

## 2023-04-07 PROCEDURE — 85025 COMPLETE CBC W/AUTO DIFF WBC: CPT

## 2023-04-07 PROCEDURE — 87040 BLOOD CULTURE FOR BACTERIA: CPT

## 2023-04-07 PROCEDURE — 97530 THERAPEUTIC ACTIVITIES: CPT

## 2023-04-07 PROCEDURE — 6370000000 HC RX 637 (ALT 250 FOR IP): Performed by: STUDENT IN AN ORGANIZED HEALTH CARE EDUCATION/TRAINING PROGRAM

## 2023-04-07 PROCEDURE — 82550 ASSAY OF CK (CPK): CPT

## 2023-04-07 PROCEDURE — 80069 RENAL FUNCTION PANEL: CPT

## 2023-04-07 PROCEDURE — 6370000000 HC RX 637 (ALT 250 FOR IP): Performed by: INTERNAL MEDICINE

## 2023-04-07 PROCEDURE — 6360000002 HC RX W HCPCS: Performed by: STUDENT IN AN ORGANIZED HEALTH CARE EDUCATION/TRAINING PROGRAM

## 2023-04-07 PROCEDURE — 1200000000 HC SEMI PRIVATE

## 2023-04-07 PROCEDURE — 83735 ASSAY OF MAGNESIUM: CPT

## 2023-04-07 PROCEDURE — 2580000003 HC RX 258: Performed by: STUDENT IN AN ORGANIZED HEALTH CARE EDUCATION/TRAINING PROGRAM

## 2023-04-07 PROCEDURE — 94760 N-INVAS EAR/PLS OXIMETRY 1: CPT

## 2023-04-07 PROCEDURE — 36415 COLL VENOUS BLD VENIPUNCTURE: CPT

## 2023-04-07 PROCEDURE — 6360000002 HC RX W HCPCS: Performed by: INTERNAL MEDICINE

## 2023-04-07 PROCEDURE — 93306 TTE W/DOPPLER COMPLETE: CPT

## 2023-04-07 RX ORDER — BUTALBITAL, ACETAMINOPHEN AND CAFFEINE 50; 325; 40 MG/1; MG/1; MG/1
1 TABLET ORAL EVERY 6 HOURS PRN
Status: DISCONTINUED | OUTPATIENT
Start: 2023-04-07 | End: 2023-04-08 | Stop reason: HOSPADM

## 2023-04-07 RX ORDER — HYDRALAZINE HYDROCHLORIDE 20 MG/ML
10 INJECTION INTRAMUSCULAR; INTRAVENOUS EVERY 4 HOURS PRN
Status: DISCONTINUED | OUTPATIENT
Start: 2023-04-07 | End: 2023-04-08 | Stop reason: HOSPADM

## 2023-04-07 RX ADMIN — ENOXAPARIN SODIUM 40 MG: 100 INJECTION SUBCUTANEOUS at 07:35

## 2023-04-07 RX ADMIN — HYDRALAZINE HYDROCHLORIDE 10 MG: 20 INJECTION INTRAMUSCULAR; INTRAVENOUS at 12:42

## 2023-04-07 RX ADMIN — INSULIN LISPRO 8 UNITS: 100 INJECTION, SOLUTION INTRAVENOUS; SUBCUTANEOUS at 12:18

## 2023-04-07 RX ADMIN — INSULIN GLARGINE 10 UNITS: 100 INJECTION, SOLUTION SUBCUTANEOUS at 20:06

## 2023-04-07 RX ADMIN — ACETAMINOPHEN 650 MG: 325 TABLET ORAL at 11:02

## 2023-04-07 RX ADMIN — ACETAMINOPHEN 650 MG: 325 TABLET ORAL at 02:35

## 2023-04-07 RX ADMIN — INSULIN LISPRO 8 UNITS: 100 INJECTION, SOLUTION INTRAVENOUS; SUBCUTANEOUS at 07:39

## 2023-04-07 RX ADMIN — ONDANSETRON 4 MG: 2 INJECTION INTRAMUSCULAR; INTRAVENOUS at 20:04

## 2023-04-07 RX ADMIN — BUTALBITAL, ACETAMINOPHEN, AND CAFFEINE 1 TABLET: 50; 325; 40 TABLET ORAL at 17:59

## 2023-04-07 RX ADMIN — INSULIN LISPRO 4 UNITS: 100 INJECTION, SOLUTION INTRAVENOUS; SUBCUTANEOUS at 17:40

## 2023-04-07 RX ADMIN — CARVEDILOL 3.12 MG: 3.12 TABLET, FILM COATED ORAL at 17:40

## 2023-04-07 RX ADMIN — CARVEDILOL 3.12 MG: 3.12 TABLET, FILM COATED ORAL at 07:35

## 2023-04-07 RX ADMIN — Medication 10 ML: at 20:04

## 2023-04-07 RX ADMIN — HYDRALAZINE HYDROCHLORIDE 10 MG: 20 INJECTION INTRAMUSCULAR; INTRAVENOUS at 17:57

## 2023-04-07 RX ADMIN — Medication 10 ML: at 07:35

## 2023-04-07 ASSESSMENT — PAIN SCALES - GENERAL
PAINLEVEL_OUTOF10: 0
PAINLEVEL_OUTOF10: 8
PAINLEVEL_OUTOF10: 5

## 2023-04-07 ASSESSMENT — PAIN DESCRIPTION - LOCATION: LOCATION: HEAD

## 2023-04-07 NOTE — DISCHARGE INSTR - COC
Continuity of Care Form    Patient Name: Willow Matamoros   :  1945  MRN:  [id number]    Admit date:  2023  Discharge date:  23      Code Status Order: Full Code   Advance Directives:     Admitting Physician:  Svitlana Velez DO  PCP: Liliam Storey MD    Discharging Nurse: YUMIKO MCCRACKEN [facility] Unit/Room#: C9F-9102/6258-04  Discharging Unit Phone Number: [phone]    Emergency Contact:   Extended Emergency Contact Information  Primary Emergency Contact: Yessi Villavicencio, [address] [place] [address] Phone: [phone]  Work Phone: [phone]  Mobile Phone: [phone]  Relation: Child  Secondary Emergency Contact: [place] Phone: [phone]  Relation: Child    Past Surgical History:  Past Surgical History:   Procedure Laterality Date    CHOLECYSTECTOMY      HYSTERECTOMY (CERVIX STATUS UNKNOWN)      TOTAL KNEE ARTHROPLASTY Right 12    TOTAL KNEE ARTHROPLASTY Left 13       Immunization History: There is no immunization history on file for this patient.     Active Problems:  Patient Active Problem List   Diagnosis Code    Joint replaced Z96.60    Tachycardia R00.0    Leukocytosis D72.829    Generalized weakness R53.1    DM2 (diabetes mellitus, type 2) (HCC) E11.9       Isolation/Infection:   Isolation            No Isolation          Patient Infection Status       Infection Onset Added Last Indicated Last Indicated By Review Planned Expiration Resolved Resolved By    None active    Resolved    COVID-19 (Rule Out) 23 COVID-19, Rapid (Ordered)   23 Rule-Out Test Resulted            Nurse Assessment:  Last Vital Signs: BP (!) 188/83   Pulse (!) 111   Temp 98.3 °F (36.8 °C) (Oral)   Resp 18   Ht 5' (1.524 m)   Wt 206 lb 5.6 oz (93.6 kg)   SpO2 97%   BMI 40.30 kg/m²     Last documented pain score (0-10 scale): Pain Level: 8  Last Weight:   Wt Readings from Last 1 Encounters:   23 206 lb 5.6 oz (93.6 kg)     Mental

## 2023-04-08 VITALS
DIASTOLIC BLOOD PRESSURE: 78 MMHG | TEMPERATURE: 97.9 F | WEIGHT: 205.47 LBS | HEART RATE: 91 BPM | BODY MASS INDEX: 33.02 KG/M2 | RESPIRATION RATE: 16 BRPM | HEIGHT: 66 IN | OXYGEN SATURATION: 95 % | SYSTOLIC BLOOD PRESSURE: 150 MMHG

## 2023-04-08 LAB
ALBUMIN SERPL-MCNC: 3.2 G/DL (ref 3.4–5)
ANION GAP SERPL CALCULATED.3IONS-SCNC: 12 MMOL/L (ref 3–16)
BACTERIA BLD CULT ORG #2: NORMAL
BACTERIA BLD CULT: NORMAL
BASOPHILS # BLD: 0 K/UL (ref 0–0.2)
BASOPHILS NFR BLD: 0.6 %
BUN SERPL-MCNC: 12 MG/DL (ref 7–20)
CALCIUM SERPL-MCNC: 8.9 MG/DL (ref 8.3–10.6)
CHLORIDE SERPL-SCNC: 100 MMOL/L (ref 99–110)
CK SERPL-CCNC: 267 U/L (ref 26–192)
CO2 SERPL-SCNC: 25 MMOL/L (ref 21–32)
CREAT SERPL-MCNC: 0.8 MG/DL (ref 0.6–1.2)
DEPRECATED RDW RBC AUTO: 19.9 % (ref 12.4–15.4)
EOSINOPHIL # BLD: 0.1 K/UL (ref 0–0.6)
EOSINOPHIL NFR BLD: 1.5 %
GFR SERPLBLD CREATININE-BSD FMLA CKD-EPI: >60 ML/MIN/{1.73_M2}
GLUCOSE BLD-MCNC: 245 MG/DL (ref 70–99)
GLUCOSE BLD-MCNC: 263 MG/DL (ref 70–99)
GLUCOSE BLD-MCNC: 296 MG/DL (ref 70–99)
GLUCOSE SERPL-MCNC: 248 MG/DL (ref 70–99)
HCT VFR BLD AUTO: 40.3 % (ref 36–48)
HGB BLD-MCNC: 13.4 G/DL (ref 12–16)
IRON SATN MFR SERPL: 37 % (ref 15–50)
IRON SERPL-MCNC: 118 UG/DL (ref 37–145)
LYMPHOCYTES # BLD: 2.2 K/UL (ref 1–5.1)
LYMPHOCYTES NFR BLD: 33.3 %
MAGNESIUM SERPL-MCNC: 1.7 MG/DL (ref 1.8–2.4)
MCH RBC QN AUTO: 23 PG (ref 26–34)
MCHC RBC AUTO-ENTMCNC: 33.4 G/DL (ref 31–36)
MCV RBC AUTO: 69 FL (ref 80–100)
MONOCYTES # BLD: 0.6 K/UL (ref 0–1.3)
MONOCYTES NFR BLD: 9.1 %
NEUTROPHILS # BLD: 3.7 K/UL (ref 1.7–7.7)
NEUTROPHILS NFR BLD: 55.5 %
PERFORMED ON: ABNORMAL
PHOSPHATE SERPL-MCNC: 3.8 MG/DL (ref 2.5–4.9)
PLATELET # BLD AUTO: 195 K/UL (ref 135–450)
PMV BLD AUTO: 8.5 FL (ref 5–10.5)
POTASSIUM SERPL-SCNC: 3.5 MMOL/L (ref 3.5–5.1)
RBC # BLD AUTO: 5.84 M/UL (ref 4–5.2)
SODIUM SERPL-SCNC: 137 MMOL/L (ref 136–145)
TIBC SERPL-MCNC: 320 UG/DL (ref 260–445)
WBC # BLD AUTO: 6.7 K/UL (ref 4–11)

## 2023-04-08 PROCEDURE — 83540 ASSAY OF IRON: CPT

## 2023-04-08 PROCEDURE — 6370000000 HC RX 637 (ALT 250 FOR IP): Performed by: INTERNAL MEDICINE

## 2023-04-08 PROCEDURE — 85025 COMPLETE CBC W/AUTO DIFF WBC: CPT

## 2023-04-08 PROCEDURE — 82550 ASSAY OF CK (CPK): CPT

## 2023-04-08 PROCEDURE — 6360000002 HC RX W HCPCS: Performed by: STUDENT IN AN ORGANIZED HEALTH CARE EDUCATION/TRAINING PROGRAM

## 2023-04-08 PROCEDURE — 83735 ASSAY OF MAGNESIUM: CPT

## 2023-04-08 PROCEDURE — 83550 IRON BINDING TEST: CPT

## 2023-04-08 PROCEDURE — 94760 N-INVAS EAR/PLS OXIMETRY 1: CPT

## 2023-04-08 PROCEDURE — 36415 COLL VENOUS BLD VENIPUNCTURE: CPT

## 2023-04-08 PROCEDURE — 80069 RENAL FUNCTION PANEL: CPT

## 2023-04-08 PROCEDURE — 2580000003 HC RX 258: Performed by: STUDENT IN AN ORGANIZED HEALTH CARE EDUCATION/TRAINING PROGRAM

## 2023-04-08 RX ADMIN — ENOXAPARIN SODIUM 40 MG: 100 INJECTION SUBCUTANEOUS at 09:18

## 2023-04-08 RX ADMIN — Medication 10 ML: at 09:21

## 2023-04-08 RX ADMIN — CARVEDILOL 3.12 MG: 3.12 TABLET, FILM COATED ORAL at 09:18

## 2023-04-08 RX ADMIN — INSULIN LISPRO 4 UNITS: 100 INJECTION, SOLUTION INTRAVENOUS; SUBCUTANEOUS at 09:19

## 2023-04-08 RX ADMIN — INSULIN LISPRO 8 UNITS: 100 INJECTION, SOLUTION INTRAVENOUS; SUBCUTANEOUS at 12:01

## 2023-04-08 ASSESSMENT — PAIN SCALES - GENERAL: PAINLEVEL_OUTOF10: 0

## 2023-04-08 NOTE — PLAN OF CARE
Problem: Discharge Planning  Goal: Discharge to home or other facility with appropriate resources  Outcome: Completed     Problem: Pain  Goal: Verbalizes/displays adequate comfort level or baseline comfort level  Outcome: Completed     Problem: Safety - Adult  Goal: Free from fall injury  Outcome: Completed     Problem: Skin/Tissue Integrity  Goal: Absence of new skin breakdown  Description: 1. Monitor for areas of redness and/or skin breakdown  2. Assess vascular access sites hourly  3. Every 4-6 hours minimum:  Change oxygen saturation probe site  4. Every 4-6 hours:  If on nasal continuous positive airway pressure, respiratory therapy assess nares and determine need for appliance change or resting period.   Outcome: Completed     Problem: Chronic Conditions and Co-morbidities  Goal: Patient's chronic conditions and co-morbidity symptoms are monitored and maintained or improved  Outcome: Completed     Problem: ABCDS Injury Assessment  Goal: Absence of physical injury  Outcome: Completed

## 2023-04-08 NOTE — PLAN OF CARE
Problem: Discharge Planning  Goal: Discharge to home or other facility with appropriate resources  Outcome: Progressing  Flowsheets (Taken 4/7/2023 2019)  Discharge to home or other facility with appropriate resources: Identify barriers to discharge with patient and caregiver     Problem: Pain  Goal: Verbalizes/displays adequate comfort level or baseline comfort level  Outcome: Progressing  Flowsheets (Taken 4/7/2023 1941)  Verbalizes/displays adequate comfort level or baseline comfort level: Encourage patient to monitor pain and request assistance     Problem: Safety - Adult  Goal: Free from fall injury  Outcome: Progressing  Flowsheets (Taken 4/7/2023 2316)  Free From Fall Injury: Instruct family/caregiver on patient safety     Problem: Skin/Tissue Integrity  Goal: Absence of new skin breakdown  Description: 1. Monitor for areas of redness and/or skin breakdown  2. Assess vascular access sites hourly  3. Every 4-6 hours minimum:  Change oxygen saturation probe site  4. Every 4-6 hours:  If on nasal continuous positive airway pressure, respiratory therapy assess nares and determine need for appliance change or resting period.   Outcome: Progressing

## 2023-04-08 NOTE — CARE COORDINATION
4/7 Met with patient and daughters at bedside. Patient refuses SNF placement. Plan: Home with Daughter Newton Gerber, and [facility] AT [facility]. Referral sent to [facility], patient okay with other agency if necessary. [facility] referral sent to [facility]. Needs BSC. Electronically signed by Peter Jeffries RN on 4/7/2023 at 2:17 PM
Case Management Discharge Note          Date / Time of Note: 4/8/2023 2:04 PM                  Patient Name: Claudio Newton   YOB: 1945  Diagnosis: Septicemia ([facility] [address] [A41.9]  Generalized weakness [R53.1]  Elevated CK [R74.8]  Elevated lactic acid level [R79.89]  Frequent falls [R29.6]  Traumatic hematoma of right eyebrow, initial encounter [S00.11XA]  Meningioma, cerebral ([address] [D32.0]   Date / Time: 4/5/2023  8:14 PM    Financial:  Payor: Lorena Hanna / Plan: [address] [place] [address] / Product Type: *No Product type* /      Pharmacy:    [address] [place] [address], [address] [place] [address]  Phone: [phone] Fax: [phone]      Assistance purchasing medications?: Potential Assistance Purchasing Medications: No  Assistance provided by Case Management: None at this time    DISCHARGE Disposition: Home with Cinthya Carver Completed: Yes      Home Care:  Home Care ordered at discharge: Yes  [address] [facility]  Phone: [phone]  Fax: [phone]  Called notification      Transportation:  Transportation PLAN for discharge: family   Mode of Transport: Private Car      IMM Completed:   Not Indicated    Additional CM Notes: [facility] notified of dc today, bedside nurse already notified pt daughter, no other needs.       Viet Begum RN, BSN,   [phone]  Electronically signed by Viet Begum RN on 4/8/2023 at 2:07 PM
Discharge Planning:  ABIGAIL Salmon spoke with [facility].. [facility]. has accepted this referral for [facility] services upon d/c. Cierra contacted for the bedside commode. MD needs to update progress note with documentation for bedside commode. SW updated Dr. Kayli Moody. Plan: Home with [facility] upon d/c.  Bedside commode will be provided to pts room. [facility] referral sent to [facility]. Dgtrs have refused SNF placement.    MAYUR Youngblood  [phone]  Electronically signed by ELIJAH Wade on 4/7/2023 at 4:33 PM
[facility]    Received referral for homecare services of which [facility], [facility], [facility], [facility]  are all out of network. [facility] requested referral for review. [facility] made aware. Electronically signed by Clay Snyder LPN on 7/6/04 at 7:56 PM EDT        Clay Snyder LPN  [facility] [address] Transition Nurse  [phone]
SW delivered [facility] to patient's room and obtained her signature. A copy was left with patient. Respectfully submitted,    Humera MERCHANT, [facility]   [phone]    Electronically signed by MAYUR Rivas, LSW on 4/7/2023 at 4:41 PM
[facility], [facility], [facility], [facility] all OON with payor. Attempted to call both [facility] and [facility], no answer at either. Referrals sent to both via Epic.      James Hodges RN, BSN CTN  [facility] [phone]
others, and if so, who? Yes (Massiel Listen (DTR))  Plans to Return to Present Housing: Yes  Other Identified Issues/Barriers to RETURNING to current housing: Fall Risk  Potential Assistance needed at discharge: Home Care            Potential DME:    Patient expects to discharge to: Other (comment) (daughter's house)  Plan for transportation at discharge: Family    Financial    Payor: Blaise Manzano / Plan: [address] HMO / Product Type: *No Product type* /     Does insurance require precert for SNF: Yes    Potential assistance Purchasing Medications: No  Meds-to-Beds request:        [address], [address], [address]  [address] [place] [address]  Phone: [phone] Fax: [phone]      Notes:    Factors facilitating achievement of predicted outcomes: Family support, Motivated, Cooperative, and Pleasant    Barriers to discharge: Fall Risk    Additional Case Management Notes: Patient can go to her daughter's house at discharge. Recommend [facility] for nursing, PT and OT. Follow for therapy recommendations. The Plan for Transition of Care is related to the following treatment goals of Septicemia ([address] [A41.9]  Generalized weakness [R53.1]  Elevated CK [R74.8]  Elevated lactic acid level [R79.89]  Frequent falls [R29.6]  Traumatic hematoma of right eyebrow, initial encounter [S00.11XA]  Meningioma, cerebral ([address] [P14.6]    IF APPLICABLE: The Patient and/or patient representative Ashok Perez and her family were provided with a choice of provider and agrees with the discharge plan. Freedom of choice list with basic dialogue that supports the patient's individualized plan of care/goals and shares the quality data associated with the providers was provided to: Patient  Patient choice is [facility]. The Patient and/or Patient Representative Agree with the Discharge Plan?  Yes    Laisha Martinez RN  Case Management

## 2023-04-08 NOTE — DISCHARGE SUMMARY
Hospital Medicine Discharge Summary    Silverio Lopez  :  1945  MRN:  [id number]    Admit date:  2023  Discharge date:  2023    Admitting Physician:  Thang Dinh DO  Primary Care Physician:  Ray Tucker MD  Code Status:   Full Code  Status: Inpatient  Discharged Condition: Stable  Activity: activity as tolerated  Diet:  ADULT DIET; Regular; 4 carb choices (60 gm/meal); Low Sodium (2 gm)      Discharge Diagnoses:      Generalized weakness    Tachycardia    Leukocytosis    Type 2 Diabetes    Meningioma      Hospital Course:   69 y.o. female admitted with generalized weakness and found to have large frontal meningioma (4.2 x 4.5 cm by contrast CT head). She fell and hit her head with right forehead hematoma (resolving). CT head suspicious for meningioma, confirmed by contrast CT and MRI. Plan for outpatient neurosurgery evaluation if needed. Frontal meningioma with left fontal edma/ Parenchymal edema seen within the adjacent left frontal lobe on MRI considered not clinically significant. Type 2 Diabetes (uncontrolled, HgbA1c 7.9% on 22). Basal glargine insulin 10 units nightly. Cover with a \"sliding scale\" lispro high dose (0-16 units) scale prandial correction insulin. Coagulase negative Staph on Blood culture:  Likely contaminant. Microcytosis without anemia:  Diagnosed with iron deficiency in the past.  Normal iron studies. Consider hemoglobin electrophoresis to assess for thalassemia.     Hypokalemia:  Potassium chloride IV replacement to goal K+ > 4.0 mmol/L     Discharge Medications:      aspirin 81 MG chewable tablet     atenolol 25 MG tablet  Commonly known as: TENORMIN     glimepiride 4 MG tablet  Commonly known as: AMARYL     metFORMIN 500 MG extended release tablet  Commonly known as: GLUCOPHAGE-XR     Symbicort 160-4.5 MCG/ACT Aero  Generic drug: budesonide-formoterol     verapamil 180 MG extended release tablet  Commonly known as: CALAN SR

## 2023-04-08 NOTE — PROGRESS NOTES
4 Eyes Skin Assessment     NAME:  Manfred Collins OF BIRTH:  1945  MEDICAL RECORD NUMBER:  [id number]    The patient is being assessed for  Admission    I agree that One RN has performed a thorough Head to Toe Skin Assessment on the patient. ALL assessment sites listed below have been assessed. Areas assessed by both nurses:    Head, Face, Ears, Shoulders, Back, Chest, Arms, Elbows, Hands, Sacrum. Buttock, Coccyx, Ischium, and Legs. Feet and Heels        Does the Patient have a Wound?  Other abrasion, bruising Right eye forehead,  Bruising right chest, left arm and shoulder       Jos Prevention initiated by RN: Yes   Wound Care Orders initiated by RN: Yes    Pressure Injury (Stage 3,4, Unstageable, DTI, NWPT, and Complex wounds) if present, place referral order by RN under : NA    New and Established Ostomies, if present place, referral order under : N/A    Nurse 1 eSignature: Electronically signed by Maral Young RN on 4/6/23 at 6:45 PM EDT    **SHARE this note so that the co-signing nurse can place an eSignature**    Nurse 2 eSignature: Electronically signed by Prince Esquivel RN on 4/6/23 at 6:59 PM EDT
Bedside report received from day shift RN. Patient resting comfortably in bed, family at bedside. No signs of discomfort or distress. Bed is in lowest position, wheels locked, 2/2 side rails up. Bedside table and call light within reach. White board updated. Will continue to monitor patient. Yon Potts RN    9:55 PM  Shift assessment complete. (See findings in flowsheet). Med pass complete. (See MAR). VSS. Patient with no complaints at this time.  Yon Potts RN
Hospitalist Progress Note  4/8/2023 11:49 AM  Subjective:   Admit Date: 4/5/2023  PCP: Margie Luu MD ([facility]) Status: Inpatient   Interval History: Hospital Day: 4, admitted with generalized weakness and found to have large frontal meningioma (4.2 x 4.5 cm by contrast CT head). She fell and hit her head with right forehead hematoma (resolving). CT head suspicious for meningioma, confirmed by contrast CT and MRI. Plan for outpatient neurosurgery.      Diet: controlled carbohydrate (60 gm/meal), 2 gram sodium  Left hand peripheral IV (4/5, day #4)  External urinary catheter (4/6, day #3)  Medications:     enoxaparin  40 mg SubCUTAneous Daily   insulin glargine  10 Units SubCUTAneous Nightly   insulin lispro SSI  0-16 Units SubCUTAneous TID WC   carvedilol  3.125 mg Oral BID WC     Recent Labs     04/05/23 2136 04/07/23  0618 04/08/23  0603   WBC 11.4* 6.3 6.7   HGB 14.4 12.7 13.4    176 195   MCV 70.4* 69.8* 69.0*     Recent Labs     04/05/23 2136 04/07/23  0618 04/08/23  0603   * 138 137   K 3.8 3.2* 3.5   CL 96* 103 100   CO2 21 23 25   BUN 19 13 12   CREATININE 0.9 0.8 0.8   GLUCOSE 426* 263* 248*     Recent Labs     04/05/23 2136   AST 24   ALT 13   BILITOT 0.9   ALKPHOS 129     Lactate (4/5) 2.6, (4/6) 1.7 mmol/L  Troponin T (4/5) negative  CK (4/5) 689, (4/7) 365, (4/8) 267 U/L  Magnesium (4/8) 1.70 mg/dL  TSH (4/5) 3.52 uIU/mL    Fe / TIBC (4/8) 118 / 320 = 37% iron saturation    Blood culture 1 (4/6) Staphylococcus coagulase negative DNA Detected (likely contaminant)  Blood culture 2 (4/6) no growth to date  Influenza A/B (4/6) negative  SARS-CoV-2 NAAT (4/6) not detected    Urinalysis (4/5) > 1000 glucose / 15+ ketones / 30+ protein    POC Glucose:   Recent Labs     04/07/23  1702 04/07/23  1943 04/08/23  0851 04/08/23  1115   POCGLU 248* 264* 245* 296*     MRI brain (4/6) There is a large extra-axial enhancing mass along the floor the anterior cranial fossa measuring up to 4.6
Occupational Therapy  Facility/Department: [facility]  Occupational Therapy Initial Assessment    Name: Jodi Velázquez  : 1945  MRN: [id number]  Date of Service: 2023    Discharge Recommendations:  24 hour supervision or assist, Home with Home health OT    OT Equipment Recommendations  Other: Bedside commode (pt would benefit from [facility] d/t decreased fxl activity tolerance and decreased fxl mobility)     Jodi Velázquez scored a 14/24 on the AM-PAC ADL Inpatient form. Current research shows that an AM-PAC score of 18 or greater is typically associated with a discharge to the patient's home setting. If patient discharges prior to next session this note will serve as a discharge summary. Please see below for the latest assessment towards goals. Patient Diagnosis(es): The primary encounter diagnosis was Elevated CK. Diagnoses of Frequent falls, Traumatic hematoma of right eyebrow, initial encounter, Elevated lactic acid level, Meningioma, cerebral ([address] and Septicemia ([facility] [address] were also pertinent to this visit. Past Medical History:  has a past medical history of Acid reflux, Arthritis, Asthma, Diabetes in pregnancy, and High blood pressure. Past Surgical History:  has a past surgical history that includes Cholecystectomy; Hysterectomy; Total knee arthroplasty (Right, 12); and Total knee arthroplasty (Left, 13). Assessment   Performance deficits / Impairments: Decreased functional mobility ; Decreased ADL status; Decreased strength;Decreased balance;Decreased cognition;Decreased ROM; Decreased endurance;Decreased high-level IADLs;Decreased safe awareness  Assessment: Pt is a 69 y.o. female admitted with fall, generalized weakness, Meningioma. PTA, pt living alone and independent ADLs and fxl mobility with walker, but has been needing increased family assist recently.  Today-pt required min A fxl transfers/mobility with RW limited to shorter distances d/t weakness and
Patient admitted to [address] [place] [address]. Patient alert and oriented x's 4. Calm and cooperative. Patient oriented to room and call light system.
Patient alert and oriented X4. Patient stated she felt nauseated. Daughter at bedside. Followed PRN protocol for nausea. Patient tolerated nightly medications well. Patient verbalizes understanding of education. Patient vital signs recorded. Fall precautions in place. Bed in lowest position, side rails X2. Bed locks on. Bed alarm on. Non slip socks on. Needed items within reach. Call light within reach.  Electronically signed by Abi Quick RN on 4/7/2023 at 8:16 PM
Patient off unit for MRI
Patient refusing to allow staff to fix cardiac telemonitor. Education given to patient. Patient still refuses telemonitor. Message sent to [facility] AT [facility]. Ok to [employer] [facility] orders.  Electronically signed by Yanni Garzon RN on 4/7/2023 at 9:16 PM
Pharmacy Medication Reconciliation Note     List of medications patient is currently taking is complete. Source of information:   1. Met with pt / family to discuss medications. 2. Fill hx/ EMR    No Known Allergies    Notes regarding home medications:   1. Removed all narcotics and Valium. These were ordered post TKR in 2023.   2. Added Atenolol, Verapamil, Amaryl, Metformin, ASA and Symbicort.  Pt confirms she is taking     [facility]   4/6/2023  1:33 PM
Physician Progress Note      PATIENT:               Zakiya Rojas  CSN #:                  [id number]  :                       1945  ADMIT DATE:       2023 8:14 PM  [address] [place] DATE:  RESPONDING  PROVIDER #:        Billye Goldmann MD          QUERY TEXT:    Dear Dr. Chirsti Muñoz,  Patient admitted with generalized weakness, tachycardia and brain lesion,   meningioma, noted to have MRI showing \" Parenchymal edema is seen within the   adjacent left frontal lobe\". If possible, please document in progress notes   and discharge summary if you are evaluating and/or treating any of the   following: The medical record reflects the following:  Risk Factors: Hx DM, Asthma, Arthritis, GERD, HTN, Current meningioma  Clinical Indicators:  ED s/p multiple falls, + fatigue, UX=088, CT showed   \"Densely enhancing mass in the frontal area\" and \"most likely meningioma\", MRI   on  \"Parenchymal edema is seen within the adjacent left frontal lobe\" and   \"large extra-axial enhancing mass along the floor the anterior cranial fossa   at the midline measuring approximately 4.5 x 4.6 x 4.1\" , \"most compatible   with a meningioma. \"  Treatment: CT, MRI, monitor and assess  Thank you,  Timmy Stoner RN, CDS  [email or web address]  Options provided:  -- Cerebral edema  -- Parenchymal edema seen within the adjacent left frontal lobe on MRI not   clinically significant  -- Other - I will add my own diagnosis  -- Disagree - Not applicable / Not valid  -- Disagree - Clinically unable to determine / Unknown  -- Refer to Clinical Documentation Reviewer    PROVIDER RESPONSE TEXT:    Parenchymal edema seen within the adjacent left frontal lobe on MRI is not   clinically significant. Query created by:  Kaylen Nichole on 2023 3:17 PM      Electronically signed by:  Billye Goldmann MD 2023 3:23 PM
Pt complaining of headache, PRN medication given (see eMAR). Pt's BP continues to be elevated, MD made aware, see new order.
Pt was assisted to the restroom her HR was up in the 140s. She said she felt SOB on exertion. She was brought back to bed HR in the 130's. /80. She denies chest pain or discomfort. Per Dr. Neelam Day, to message khoi NUNN for assistance. Message sent to Dr. Enma Stephenson order for stat EKG per Dr. Rafael Mazariegos. EKG completed. Sinus tachycardia with Premature artial complexes, Other mckoy normal ECG. Dr. Rafael Mazariegos notified at time of completion.  New orders entered per MD.
Pt's family at bedside requesting to speak with hospitalist.  Dr. Muriel Rodríguez made aware of family's request to see MD and also of request to transfer pt to .
SPECGRAV 1.020 04/05/2023 11:35 PM    GLUCOSEU >=1000 04/05/2023 11:35 PM       Radiology:  MRI BRAIN W WO CONTRAST   Final Result   1. There is a large extra-axial enhancing mass along the floor the anterior   cranial fossa measuring up to 4.6 cm most compatible with a meningioma. This   indents on the frontal lobes bilaterally with parenchymal edema in the   adjacent left frontal lobe. 2. No acute intracranial abnormality otherwise seen. No acute infarct. 3. Mild global parenchymal volume loss with mild-to-moderate chronic   microvascular ischemic changes. CT CHEST PULMONARY EMBOLISM W CONTRAST   Final Result   No evidence of pulmonary embolism. No evidence of thoracic aortic aneurysm or dissection. No acute process in the mediastinum. No evidence of pericardial effusion. No definable focal abnormality or acute process in the lungs or in the   pleural spaces. On the subphrenic images, there is no definable acute process. At the upper   lateral aspect of right kidney there is an exophytic calcified small mass,   likely to be calcified or calcifying small exophytic cyst measuring 7-8 mm. CT HEAD W CONTRAST   Final Result   1. Densely enhancing mass in the frontal area which is extra-axial likely   arising from the sphenoid ridge representing a large meningioma which   measures 4.2 x 4.5 cm. This is an extra-axial mass. 2. No other significant abnormality. CT HEAD WO CONTRAST   Final Result   Large mass in the frontal region which is most likely a meningioma. However,   intravenous contrast (not a CTA ) is recommended. XR CHEST (2 VW)   Final Result   No acute process. IP CONSULT TO NEUROSURGERY  IP CONSULT TO SOCIAL WORK    ASSESSMENT:  Generalized weakness  2. Frontal meningioma with left fontal edma/ Parenchymal edema seen within the adjacent left frontal lobe on MRI not   clinically significant  3.    DM II       PLAN  PT /OT Sindy Nguyen
Demonstration;Verbal  Barriers to Learning: None  Education Outcome: Verbalized understanding;Continued education needed      Therapy Time   Individual Concurrent Group Co-treatment   Time In 1400         Time Out 1440         Minutes 40         Timed Code Treatment Minutes: [address] [place], [place] [id number]
4/7/2023  Electronically signed by Leticia House, [address] [place] [address] Drive [phone]  on 4/7/2023 at 10:39 AM

## 2023-04-09 LAB
BACTERIA BLD CULT: ABNORMAL
BACTERIA BLD CULT: ABNORMAL
ORGANISM: ABNORMAL
ORGANISM: ABNORMAL

## 2023-06-29 ENCOUNTER — APPOINTMENT (OUTPATIENT)
Dept: GENERAL RADIOLOGY | Age: 78
End: 2023-06-29
Payer: MEDICARE

## 2023-06-29 LAB
ALBUMIN SERPL-MCNC: 2.2 G/DL (ref 3.4–5)
ALBUMIN/GLOB SERPL: 0.8 {RATIO} (ref 1.1–2.2)
ALP SERPL-CCNC: 72 U/L (ref 40–129)
ALT SERPL-CCNC: 340 U/L (ref 10–40)
ANION GAP SERPL CALCULATED.3IONS-SCNC: 25 MMOL/L (ref 3–16)
ANISOCYTOSIS BLD QL SMEAR: ABNORMAL
AST SERPL-CCNC: 329 U/L (ref 15–37)
BASOPHILS # BLD: 0 K/UL (ref 0–0.2)
BASOPHILS NFR BLD: 0 %
BILIRUB SERPL-MCNC: <0.2 MG/DL (ref 0–1)
BUN SERPL-MCNC: 24 MG/DL (ref 7–20)
CALCIUM SERPL-MCNC: 9.8 MG/DL (ref 8.3–10.6)
CHLORIDE SERPL-SCNC: 102 MMOL/L (ref 99–110)
CO2 SERPL-SCNC: 7 MMOL/L (ref 21–32)
CREAT SERPL-MCNC: 1.3 MG/DL (ref 0.6–1.2)
DEPRECATED RDW RBC AUTO: 18.9 % (ref 12.4–15.4)
EOSINOPHIL # BLD: 0.1 K/UL (ref 0–0.6)
EOSINOPHIL NFR BLD: 1 %
GFR SERPLBLD CREATININE-BSD FMLA CKD-EPI: 42 ML/MIN/{1.73_M2}
GLUCOSE SERPL-MCNC: 583 MG/DL (ref 70–99)
HCT VFR BLD AUTO: 37.3 % (ref 36–48)
HGB BLD-MCNC: 10.9 G/DL (ref 12–16)
INR PPP: 1.52 (ref 0.84–1.16)
LYMPHOCYTES # BLD: 6.5 K/UL (ref 1–5.1)
LYMPHOCYTES NFR BLD: 47 %
MCH RBC QN AUTO: 25 PG (ref 26–34)
MCHC RBC AUTO-ENTMCNC: 29.3 G/DL (ref 31–36)
MCV RBC AUTO: 85.1 FL (ref 80–100)
MONOCYTES # BLD: 0.8 K/UL (ref 0–1.3)
MONOCYTES NFR BLD: 6 %
MYELOCYTES NFR BLD MANUAL: 1 %
NEUTROPHILS # BLD: 6.1 K/UL (ref 1.7–7.7)
NEUTROPHILS NFR BLD: 43 %
NEUTS BAND NFR BLD MANUAL: 1 % (ref 0–7)
NT-PROBNP SERPL-MCNC: 5169 PG/ML (ref 0–449)
PATH INTERP BLD-IMP: NO
PLATELET # BLD AUTO: 84 K/UL (ref 135–450)
PLATELET BLD QL SMEAR: ABNORMAL
PMV BLD AUTO: 8.1 FL (ref 5–10.5)
POLYCHROMASIA BLD QL SMEAR: ABNORMAL
POTASSIUM SERPL-SCNC: 4.9 MMOL/L (ref 3.5–5.1)
PROT SERPL-MCNC: 4.9 G/DL (ref 6.4–8.2)
PROTHROMBIN TIME: 18.3 SEC (ref 11.5–14.8)
RBC # BLD AUTO: 4.39 M/UL (ref 4–5.2)
SLIDE REVIEW: ABNORMAL
SODIUM SERPL-SCNC: 134 MMOL/L (ref 136–145)
TROPONIN, HIGH SENSITIVITY: 92 NG/L (ref 0–14)
VARIANT LYMPHS NFR BLD MANUAL: 1 % (ref 0–6)
WBC # BLD AUTO: 13.6 K/UL (ref 4–11)